# Patient Record
Sex: FEMALE | Race: WHITE | NOT HISPANIC OR LATINO | Employment: UNEMPLOYED | URBAN - NONMETROPOLITAN AREA
[De-identification: names, ages, dates, MRNs, and addresses within clinical notes are randomized per-mention and may not be internally consistent; named-entity substitution may affect disease eponyms.]

---

## 2017-07-29 ENCOUNTER — HISTORY (OUTPATIENT)
Dept: FAMILY MEDICINE | Facility: OTHER | Age: 32
End: 2017-07-29

## 2017-07-29 ENCOUNTER — OFFICE VISIT - GICH (OUTPATIENT)
Dept: FAMILY MEDICINE | Facility: OTHER | Age: 32
End: 2017-07-29

## 2017-07-29 DIAGNOSIS — S91.312A: ICD-10-CM

## 2017-12-28 NOTE — PROGRESS NOTES
"Patient Information     Patient Name MRN Sex     Shey Malone [id number] Female 1985      Progress Notes by Shanelle Segundo NP at 2017 12:30 PM     Author:  Shanelle Segundo NP Service:  (none) Author Type:  PHYS- Nurse Practitioner     Filed:  2017  6:54 PM Encounter Date:  2017 Status:  Signed     :  Shanelle Segundo NP (PHYS- Nurse Practitioner)            HPI:  Nursing Notes:   Rebecca Norton  2017  1:23 PM  Signed  Patient presents to clinic with right foot laceration. She cut it on chain link fence.  Rebecca SHERWOOD NaderN ....................  2017   1:06 PM    Shey Malone is a 31 y.o. female who presents to clinic today for cut on top of right foot. Cut foot on chain link fence. Wrapped with gauze and came to [facility] for evaluation. Last Tdap .     No past medical history on file.  No past surgical history on file.  Social History     Substance Use Topics       Smoking status: Never Smoker     Smokeless tobacco: Never Used     Alcohol use Not on file     No current outpatient prescriptions on file.     No current facility-administered medications for this visit.      Medications have been reviewed by me and are current to the best of my knowledge and ability.    No Known Allergies    ROS:  Refer to HPI    /68  Pulse 76  Temp 98.7  F (37.1  C) (Tympanic)   Ht 1.575 m (5' 2\")  Wt 59.4 kg (131 lb)  LMP 2017  BMI 23.96 kg/m2    EXAM:  General Appearance: Well appearing  appropriate appearance for age. No acute distress  Cardiovascular: Brisk pedal pulse right foot  Musculoskeletal: good ROM right foot, no tendon involvement  Dermatological: superficial laceration, 4 cm jagged linear laceration dorsal right foot, inferior to second to fifth phlanges  Psychological: normal affect, alert and pleasant    ASSESSMENT/PLAN:    ICD-10-CM    1. Foot laceration, left, initial encounter S91.312A CT REPAIR SUPERF " WOUND BODY < OR = 2.5 CM   Laceration across top of right foot  On exam: well appearing female in no apparent distress with good rom in right foot, no evidence of tendon involvement with 4 cm jagged linear laceration dorsal right foot, inferior to second to fifth phlanges  Diagnosis: Laceration  Discussed risks and benefits of closing laceration with sutures vs Dermabond, the nature of this wound is superficial and the patient desires a quick closure  Dermabond seems appropriate at this time  Wound is thoroughly cleaned by nursing staff with Hibiclens  Dermabond is applied and allowed to dry, Telfa and gauze dressing applied  Patient tolerated well  Instructed not to apply any petroleum based products to Dermabound  Please follow up if s/s of infection devlop-or any concerns    Patient Instructions      Index Chinese   Wound Closure and Wound Care   ________________________________________________________________________  KEY POINTS    Most wounds heal more quickly, and with less risk of infection and scarring, when the wound is kept clean and the edges are held close together as it heals.    Your healthcare provider's instructions will include the type of follow up care you will need.    Call your healthcare provider if you have increased bleeding, redness, swelling, pain, fever, or any other questions about caring for your wound.  ________________________________________________________________________  What is wound closure?  Wound closure is a procedure that your healthcare provider can do to hold the edges of a wound together. Most wounds heal more quickly, and with less risk of infection and scarring, when the wound is kept clean and the edges are held close together as it heals.  Cuts and punctures are wounds that go through several layers of skin and may go into the fat, muscle, blood vessels, or other tissues under the skin. A cut through your skin made by a healthcare provider when you have surgery is  another type of wound that may need closure.  Wounds may be closed with skin glue, tape strips, stitches, or staples. Which method will be used depends on the wound. Small, superficial (not deep) wounds can often easily be closed with glue. Deeper wounds may need tape, stitches, or staples.  If you have not had a tetanus shot in the last 5 years, and you have a dirty wound, your healthcare provider may give you a tetanus shot.  It is very important to follow your provider's instructions to help the wound heal, to prevent an infection, and to lessen scarring.  How can I take care of myself?  Follow the instructions from your healthcare provider for taking care of the wound. Instructions may include:    Keep the wound clean and dry. Don't get the wound wet unless your healthcare provider has told you it s OK. Ask if it s OK for you to take a bath or shower. Ask when it will be okay for you to go swimming.    Wash your hands thoroughly with soap and water for at least 20 seconds before you touch the area.    If you have a bandage over the wound, follow your provider's instructions for cleaning the wound and changing the bandage. Don t use any ointment, cream, or lotion on the wound unless your provider has told you to do so.    If your wound is large and on your arm or leg, you may need to keep the wound up higher than your heart as often as possible for a few days. For example, you can put your arm or leg up on a pillow. This will help lessen pain and swelling.    Protect the wound from injury and sunburn until the skin has had time to heal.    Don t scratch, rub, or pick at the wound. This can cause the wound to open up and can cause infection and a larger scar.    Your healthcare provider's instructions will include the type of follow up care you will need. For example:    Tape strips are often left on until they fall off on their own. Some tape strips may start to fall off a few days after they are put on. All of  the tape strips should fall off by the end of 2 weeks.    Skin glue falls off on its own in 5 to 10 days.    Stitches or staples need to be removed by your healthcare provider in 5 to 14 days, depending on where the wound is located. Some stitches dissolve on their own in 7 to 14 days and don t need to be removed. Ask your healthcare provider what kind of stitches you have.    As the wound heals, some swelling, redness, and minor pain are normal.    Keep your appointment for stitch or staple removal if you need one.    Call your healthcare provider if you have:    Symptoms of infection, which include new or worse redness, swelling, pain, warmth, or drainage in the area of the wound    New bleeding from the wound that won't stop    Pain that is increasing or not getting better with pain medicine    Fever, chills, nausea, vomiting, or muscle aches    Any questions about caring for the wound  Developed by [employer].  Adult Advisor 2016.3 published by [employer].  Last modified: 2015-10-02  Last reviewed: 2015-10-02  This content is reviewed periodically and is subject to change as new health information becomes available. The information is intended to inform and educate and is not a replacement for medical evaluation, advice, diagnosis or treatment by a healthcare professional.  References   Adult Advisor 2016.3 Index    Copyright   2016 [employer], a division of [employer] All rights reserved.

## 2017-12-28 NOTE — PATIENT INSTRUCTIONS
Patient Information     Patient Name MRN Shey Cao [id number] Female 1985      Patient Instructions by Shanelle Segundo NP at 2017 12:30 PM     Author:  Shanelle Segundo NP Service:  (none) Author Type:  PHYS- Nurse Practitioner     Filed:  2017  1:47 PM Encounter Date:  2017 Status:  Signed     :  Shanelle Segundo NP (PHYS- Nurse Practitioner)               Index Setswana   Wound Closure and Wound Care   ________________________________________________________________________  KEY POINTS    Most wounds heal more quickly, and with less risk of infection and scarring, when the wound is kept clean and the edges are held close together as it heals.    Your healthcare provider's instructions will include the type of follow up care you will need.    Call your healthcare provider if you have increased bleeding, redness, swelling, pain, fever, or any other questions about caring for your wound.  ________________________________________________________________________  What is wound closure?  Wound closure is a procedure that your healthcare provider can do to hold the edges of a wound together. Most wounds heal more quickly, and with less risk of infection and scarring, when the wound is kept clean and the edges are held close together as it heals.  Cuts and punctures are wounds that go through several layers of skin and may go into the fat, muscle, blood vessels, or other tissues under the skin. A cut through your skin made by a healthcare provider when you have surgery is another type of wound that may need closure.  Wounds may be closed with skin glue, tape strips, stitches, or staples. Which method will be used depends on the wound. Small, superficial (not deep) wounds can often easily be closed with glue. Deeper wounds may need tape, stitches, or staples.  If you have not had a tetanus shot in the last 5 years, and you have a dirty wound, your  healthcare provider may give you a tetanus shot.  It is very important to follow your provider's instructions to help the wound heal, to prevent an infection, and to lessen scarring.  How can I take care of myself?  Follow the instructions from your healthcare provider for taking care of the wound. Instructions may include:    Keep the wound clean and dry. Don't get the wound wet unless your healthcare provider has told you it s OK. Ask if it s OK for you to take a bath or shower. Ask when it will be okay for you to go swimming.    Wash your hands thoroughly with soap and water for at least 20 seconds before you touch the area.    If you have a bandage over the wound, follow your provider's instructions for cleaning the wound and changing the bandage. Don t use any ointment, cream, or lotion on the wound unless your provider has told you to do so.    If your wound is large and on your arm or leg, you may need to keep the wound up higher than your heart as often as possible for a few days. For example, you can put your arm or leg up on a pillow. This will help lessen pain and swelling.    Protect the wound from injury and sunburn until the skin has had time to heal.    Don t scratch, rub, or pick at the wound. This can cause the wound to open up and can cause infection and a larger scar.    Your healthcare provider's instructions will include the type of follow up care you will need. For example:    Tape strips are often left on until they fall off on their own. Some tape strips may start to fall off a few days after they are put on. All of the tape strips should fall off by the end of 2 weeks.    Skin glue falls off on its own in 5 to 10 days.    Stitches or staples need to be removed by your healthcare provider in 5 to 14 days, depending on where the wound is located. Some stitches dissolve on their own in 7 to 14 days and don t need to be removed. Ask your healthcare provider what kind of stitches you have.    As  the wound heals, some swelling, redness, and minor pain are normal.    Keep your appointment for stitch or staple removal if you need one.    Call your healthcare provider if you have:    Symptoms of infection, which include new or worse redness, swelling, pain, warmth, or drainage in the area of the wound    New bleeding from the wound that won't stop    Pain that is increasing or not getting better with pain medicine    Fever, chills, nausea, vomiting, or muscle aches    Any questions about caring for the wound  Developed by [employer].  Adult Advisor 2016.3 published by [employer].  Last modified: 2015-10-02  Last reviewed: 2015-10-02  This content is reviewed periodically and is subject to change as new health information becomes available. The information is intended to inform and educate and is not a replacement for medical evaluation, advice, diagnosis or treatment by a healthcare professional.  References   Adult Advisor 2016.3 Index    Copyright   2016 [employer], a division of [employer] All rights reserved.

## 2017-12-30 NOTE — NURSING NOTE
Patient Information     Patient Name MRN Shey Cao [id number] Female 1985      Nursing Note by Rebecca Norton at 2017 12:30 PM     Author:  Rebecca Norton Service:  (none) Author Type:  (none)     Filed:  2017  1:23 PM Encounter Date:  2017 Status:  Signed     :  Rebecca Norton            Patient presents to clinic with right foot laceration. She cut it on chain link fence.  Rebecca Foster ....................  2017   1:06 PM

## 2018-01-27 VITALS
SYSTOLIC BLOOD PRESSURE: 118 MMHG | BODY MASS INDEX: 24.11 KG/M2 | DIASTOLIC BLOOD PRESSURE: 68 MMHG | HEIGHT: 62 IN | TEMPERATURE: 98.7 F | HEART RATE: 76 BPM | WEIGHT: 131 LBS

## 2020-09-09 ENCOUNTER — TRANSFERRED RECORDS (OUTPATIENT)
Dept: MULTI SPECIALTY CLINIC | Facility: CLINIC | Age: 35
End: 2020-09-09

## 2020-09-09 LAB
HPV ABSTRACT: NORMAL
PAP-ABSTRACT: ABNORMAL

## 2024-04-02 ENCOUNTER — HOSPITAL ENCOUNTER (EMERGENCY)
Facility: OTHER | Age: 39
Discharge: HOME | End: 2024-04-02
Attending: EMERGENCY MEDICINE | Admitting: EMERGENCY MEDICINE
Payer: COMMERCIAL

## 2024-04-02 VITALS
TEMPERATURE: 97.7 F | RESPIRATION RATE: 13 BRPM | HEART RATE: 85 BPM | DIASTOLIC BLOOD PRESSURE: 76 MMHG | SYSTOLIC BLOOD PRESSURE: 132 MMHG | WEIGHT: 140 LBS | BODY MASS INDEX: 25.76 KG/M2 | OXYGEN SATURATION: 100 % | HEIGHT: 62 IN

## 2024-04-02 DIAGNOSIS — R06.02 SHORTNESS OF BREATH: ICD-10-CM

## 2024-04-02 DIAGNOSIS — R07.89 CHEST HEAVINESS: ICD-10-CM

## 2024-04-02 LAB
ALBUMIN UR-MCNC: NEGATIVE MG/DL
ANION GAP SERPL CALCULATED.3IONS-SCNC: 16 MMOL/L (ref 7–15)
APPEARANCE UR: CLEAR
BACTERIA #/AREA URNS HPF: ABNORMAL /HPF
BASOPHILS # BLD AUTO: 0 10E3/UL (ref 0–0.2)
BASOPHILS NFR BLD AUTO: 1 %
BILIRUB UR QL STRIP: NEGATIVE
BUN SERPL-MCNC: 13.1 MG/DL (ref 6–20)
CALCIUM SERPL-MCNC: 9.7 MG/DL (ref 8.6–10)
CHLORIDE SERPL-SCNC: 101 MMOL/L (ref 98–107)
COLOR UR AUTO: YELLOW
CREAT SERPL-MCNC: 0.71 MG/DL (ref 0.51–0.95)
DEPRECATED HCO3 PLAS-SCNC: 21 MMOL/L (ref 22–29)
EGFRCR SERPLBLD CKD-EPI 2021: >90 ML/MIN/1.73M2
EOSINOPHIL # BLD AUTO: 0.1 10E3/UL (ref 0–0.7)
EOSINOPHIL NFR BLD AUTO: 1 %
ERYTHROCYTE [DISTWIDTH] IN BLOOD BY AUTOMATED COUNT: 12.5 % (ref 10–15)
GLUCOSE SERPL-MCNC: 153 MG/DL (ref 70–99)
GLUCOSE UR STRIP-MCNC: NEGATIVE MG/DL
HCG UR QL: NEGATIVE
HCT VFR BLD AUTO: 38.2 % (ref 35–47)
HGB BLD-MCNC: 12.9 G/DL (ref 11.7–15.7)
HGB UR QL STRIP: NEGATIVE
HOLD SPECIMEN: NORMAL
IMM GRANULOCYTES # BLD: 0 10E3/UL
IMM GRANULOCYTES NFR BLD: 0 %
KETONES UR STRIP-MCNC: NEGATIVE MG/DL
LEUKOCYTE ESTERASE UR QL STRIP: NEGATIVE
LYMPHOCYTES # BLD AUTO: 1.6 10E3/UL (ref 0.8–5.3)
LYMPHOCYTES NFR BLD AUTO: 20 %
MAGNESIUM SERPL-MCNC: 2 MG/DL (ref 1.7–2.3)
MCH RBC QN AUTO: 29.6 PG (ref 26.5–33)
MCHC RBC AUTO-ENTMCNC: 33.8 G/DL (ref 31.5–36.5)
MCV RBC AUTO: 88 FL (ref 78–100)
MONOCYTES # BLD AUTO: 0.4 10E3/UL (ref 0–1.3)
MONOCYTES NFR BLD AUTO: 5 %
NEUTROPHILS # BLD AUTO: 5.7 10E3/UL (ref 1.6–8.3)
NEUTROPHILS NFR BLD AUTO: 73 %
NITRATE UR QL: NEGATIVE
NRBC # BLD AUTO: 0 10E3/UL
NRBC BLD AUTO-RTO: 0 /100
PH UR STRIP: 6 [PH] (ref 5–9)
PLATELET # BLD AUTO: 299 10E3/UL (ref 150–450)
POTASSIUM SERPL-SCNC: 3.1 MMOL/L (ref 3.4–5.3)
RBC # BLD AUTO: 4.36 10E6/UL (ref 3.8–5.2)
RBC URINE: 1 /HPF
SODIUM SERPL-SCNC: 138 MMOL/L (ref 135–145)
SP GR UR STRIP: 1 (ref 1–1.03)
UROBILINOGEN UR STRIP-MCNC: NORMAL MG/DL
WBC # BLD AUTO: 7.7 10E3/UL (ref 4–11)
WBC URINE: 1 /HPF

## 2024-04-02 PROCEDURE — 81025 URINE PREGNANCY TEST: CPT | Performed by: EMERGENCY MEDICINE

## 2024-04-02 PROCEDURE — 93005 ELECTROCARDIOGRAM TRACING: CPT | Performed by: EMERGENCY MEDICINE

## 2024-04-02 PROCEDURE — 99284 EMERGENCY DEPT VISIT MOD MDM: CPT | Performed by: EMERGENCY MEDICINE

## 2024-04-02 PROCEDURE — 93010 ELECTROCARDIOGRAM REPORT: CPT | Performed by: INTERNAL MEDICINE

## 2024-04-02 PROCEDURE — 36415 COLL VENOUS BLD VENIPUNCTURE: CPT | Performed by: EMERGENCY MEDICINE

## 2024-04-02 PROCEDURE — 81001 URINALYSIS AUTO W/SCOPE: CPT | Performed by: EMERGENCY MEDICINE

## 2024-04-02 PROCEDURE — 80048 BASIC METABOLIC PNL TOTAL CA: CPT | Performed by: EMERGENCY MEDICINE

## 2024-04-02 PROCEDURE — 83735 ASSAY OF MAGNESIUM: CPT | Performed by: EMERGENCY MEDICINE

## 2024-04-02 PROCEDURE — 85025 COMPLETE CBC W/AUTO DIFF WBC: CPT | Performed by: EMERGENCY MEDICINE

## 2024-04-02 RX ORDER — HYDROXYZINE PAMOATE 25 MG/1
25 CAPSULE ORAL 3 TIMES DAILY PRN
Qty: 5 CAPSULE | Refills: 0 | Status: SHIPPED | OUTPATIENT
Start: 2024-04-02

## 2024-04-02 ASSESSMENT — ENCOUNTER SYMPTOMS
CHILLS: 0
VOMITING: 0
AGITATION: 0
FEVER: 0
NAUSEA: 0
ABDOMINAL PAIN: 0
LIGHT-HEADEDNESS: 1
PALPITATIONS: 1
ARTHRALGIAS: 0
DYSURIA: 0
SHORTNESS OF BREATH: 1
CHEST TIGHTNESS: 0

## 2024-04-02 ASSESSMENT — COLUMBIA-SUICIDE SEVERITY RATING SCALE - C-SSRS
2. HAVE YOU ACTUALLY HAD ANY THOUGHTS OF KILLING YOURSELF IN THE PAST MONTH?: NO
1. IN THE PAST MONTH, HAVE YOU WISHED YOU WERE DEAD OR WISHED YOU COULD GO TO SLEEP AND NOT WAKE UP?: NO
6. HAVE YOU EVER DONE ANYTHING, STARTED TO DO ANYTHING, OR PREPARED TO DO ANYTHING TO END YOUR LIFE?: NO

## 2024-04-02 ASSESSMENT — ACTIVITIES OF DAILY LIVING (ADL)
ADLS_ACUITY_SCORE: 35
ADLS_ACUITY_SCORE: 35
ADLS_ACUITY_SCORE: 33

## 2024-04-02 NOTE — DISCHARGE INSTRUCTIONS
If this happens again you could try one of the Vistaril tablets.  Follow-up in the clinic to discuss with primary provider.  You can also try calling 211 if this were to happen again.  You can always return here for further evaluation as well.

## 2024-04-02 NOTE — ED PROVIDER NOTES
History     Chief Complaint   Patient presents with    Shortness of Breath    Chest Pain     HPI  Shey Malone is a 38 year old female who comes in by private car after having had an episode of shortness of breath, chest heaviness and feeling a little bit spacey.  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  She was able to finish her piano lesson.  Symptoms then seemed to get somewhat better but seem to be continue to come and go since then.  She says she had a similar episode last week which did not last nearly as long.  Today she said she could also feel her heart beating in her chest.  She said it was regular and fast.  There is no pain.  No diaphoresis no nausea.  Nothing made it better or worse.  Has not been ill recently since she recovered from some mild URI type symptoms a couple weeks ago.  Has been eating and drinking normally.  No pain anywhere.  She herself brought up the possibility that this could be panic attacks, however she says she has not been feeling anxious or panicky and has never had symptoms like this in the past.  Her  states that when he got home from work today to bring her in and she seemed okay.  In the ride in however she seemed to get worse again.  She got better by the time they are here and then walking him again got worse.  When he describes that she gets a little bit tearful.    Allergies:  No Known Allergies    Problem List:    There are no problems to display for this patient.       Past Medical History:    No past medical history on file.    Past Surgical History:    No past surgical history on file.    Family History:    No family history on file.    Social History:  Marital Status:   [2]  Social History     Tobacco Use    Smoking status: Never    Smokeless tobacco: Never   Substance Use Topics    Drug use: Unknown     Types: Other     Comment: Drug use: Not Asked        Medications:    hydrOXYzine  "sonia (VISTARIL) 25 MG capsule          Review of Systems   Constitutional:  Negative for chills and fever.   HENT:  Negative for congestion.    Eyes:  Negative for visual disturbance.   Respiratory:  Positive for shortness of breath. Negative for chest tightness.    Cardiovascular:  Positive for palpitations. Negative for chest pain.   Gastrointestinal:  Negative for abdominal pain, nausea and vomiting.   Genitourinary:  Negative for dysuria.   Musculoskeletal:  Negative for arthralgias.   Skin:  Negative for rash.   Neurological:  Positive for light-headedness.   Psychiatric/Behavioral:  Negative for agitation.        Physical Exam   BP: (!) 154/93  Pulse: (!) 127  Temp: 97.8  F (36.6  C)  Resp: 24  Height: 157.5 cm (5' 2\")  Weight: 63.5 kg (140 lb)  SpO2: 100 %      Physical Exam  Vitals and nursing note reviewed.   Constitutional:       Appearance: She is well-developed.   HENT:      Head: Normocephalic and atraumatic.      Mouth/Throat:      Mouth: Mucous membranes are moist.   Eyes:      Conjunctiva/sclera: Conjunctivae normal.   Cardiovascular:      Rate and Rhythm: Normal rate and regular rhythm.      Heart sounds: Normal heart sounds.   Pulmonary:      Effort: Pulmonary effort is normal.      Breath sounds: Normal breath sounds.   Abdominal:      General: Abdomen is flat. Bowel sounds are normal. There is no distension.      Palpations: Abdomen is soft.      Tenderness: There is no abdominal tenderness.   Skin:     General: Skin is warm and dry.   Neurological:      Mental Status: She is alert and oriented to person, place, and time.   Psychiatric:         Speech: Speech normal.         Behavior: Behavior normal.      Comments: She does seem a little nervous and anxious about this.  Does get slightly tearful at 1 point during the exam also.         ED Course        Procedures         EKG shows sinus tachycardia 110 bpm.  No acute ST segment or T wave changes.  No ectopy.         Results for orders placed or " performed during the hospital encounter of 04/02/24 (from the past 24 hour(s))   [place] Draw    Narrative    The following orders were created for panel order [place] Draw.  Procedure                               Abnormality         Status                     ---------                               -----------         ------                     Extra Blue Top Tube[[id number]]                              Final result               Extra Red Top Tube[[id number]]                               Final result               Extra Green Top (Lithium...[[id number]]                      Final result               Extra Purple Top Tube[[id number]]                            Final result                 Please view results for these tests on the individual orders.   Extra Blue Top Tube   Result Value Ref Range    Hold Specimen JIC    Extra Red Top Tube   Result Value Ref Range    Hold Specimen hold    Extra Green Top (Lithium Heparin) Tube   Result Value Ref Range    Hold Specimen JIC    Extra Purple Top Tube   Result Value Ref Range    Hold Specimen JIC    CBC with platelets differential    Narrative    The following orders were created for panel order CBC with platelets differential.  Procedure                               Abnormality         Status                     ---------                               -----------         ------                     CBC with platelets and d...[[id number]]                      Final result                 Please view results for these tests on the individual orders.   CBC with platelets and differential   Result Value Ref Range    WBC Count 7.7 4.0 - 11.0 10e3/uL    RBC Count 4.36 3.80 - 5.20 10e6/uL    Hemoglobin 12.9 11.7 - 15.7 g/dL    Hematocrit 38.2 35.0 - 47.0 %    MCV 88 78 - 100 fL    MCH 29.6 26.5 - 33.0 pg    MCHC 33.8 31.5 - 36.5 g/dL    RDW 12.5 10.0 - 15.0 %    Platelet Count 299 150 - 450 10e3/uL    % Neutrophils 73 %    % Lymphocytes 20 %    % Monocytes 5 %    % Eosinophils 1  %    % Basophils 1 %    % Immature Granulocytes 0 %    NRBCs per 100 WBC 0 <1 /100    Absolute Neutrophils 5.7 1.6 - 8.3 10e3/uL    Absolute Lymphocytes 1.6 0.8 - 5.3 10e3/uL    Absolute Monocytes 0.4 0.0 - 1.3 10e3/uL    Absolute Eosinophils 0.1 0.0 - 0.7 10e3/uL    Absolute Basophils 0.0 0.0 - 0.2 10e3/uL    Absolute Immature Granulocytes 0.0 <=0.4 10e3/uL    Absolute NRBCs 0.0 10e3/uL   Extra Tube *Canceled*    Narrative    The following orders were created for panel order Extra Tube.  Procedure                               Abnormality         Status                     ---------                               -----------         ------                     Extra Purple Top Tube[[id number]]                                                         Please view results for these tests on the individual orders.   Extra Tube    Narrative    The following orders were created for panel order Extra Tube.  Procedure                               Abnormality         Status                     ---------                               -----------         ------                     Extra Green Top (Lithium...[[id number]]                      Final result                 Please view results for these tests on the individual orders.   Extra Green Top (Lithium Heparin) ON ICE   Result Value Ref Range    Hold Specimen [facility]    Basic metabolic panel   Result Value Ref Range    Sodium 138 135 - 145 mmol/L    Potassium 3.1 (L) 3.4 - 5.3 mmol/L    Chloride 101 98 - 107 mmol/L    Carbon Dioxide (CO2) 21 (L) 22 - 29 mmol/L    Anion Gap 16 (H) 7 - 15 mmol/L    Urea Nitrogen 13.1 6.0 - 20.0 mg/dL    Creatinine 0.71 0.51 - 0.95 mg/dL    GFR Estimate >90 >60 mL/min/1.73m2    Calcium 9.7 8.6 - 10.0 mg/dL    Glucose 153 (H) 70 - 99 mg/dL   Magnesium   Result Value Ref Range    Magnesium 2.0 1.7 - 2.3 mg/dL   UA with Microscopic reflex to Culture    Specimen: Urine, Clean Catch   Result Value Ref Range    Color Urine Yellow Colorless, Straw, Light  Yellow, Yellow    Appearance Urine Clear Clear    Glucose Urine Negative Negative mg/dL    Bilirubin Urine Negative Negative    Ketones Urine Negative Negative mg/dL    Specific Gravity Urine 1.004 1.000 - 1.030    Blood Urine Negative Negative    pH Urine 6.0 5.0 - 9.0    Protein Albumin Urine Negative Negative mg/dL    Urobilinogen Urine Normal Normal, 2.0 mg/dL    Nitrite Urine Negative Negative    Leukocyte Esterase Urine Negative Negative    Bacteria Urine Few (A) None Seen /HPF    RBC Urine 1 <=2 /HPF    WBC Urine 1 <=5 /HPF    Narrative    Urine Culture not indicated   HCG qualitative urine (UPT)   Result Value Ref Range    hCG Urine Qualitative Negative Negative       Medications - No data to display    Assessments & Plan (with Medical Decision Making)     I have reviewed the nursing notes.    I have reviewed the findings, diagnosis, plan and need for follow up with the patient.  Labs show a mild hypokalemia, otherwise CBC, metabolic panel, urinalysis and hCG all unremarkable.  She has been on the cardiac monitor her entire stay here and has been in sinus rhythm with no ectopy or any other arrhythmias.  We discussed the possibility of something like a cardiac arrhythmia that could be causing her symptoms, however I do not see any evidence of that at this time and I feel is somewhat unlikely.  She is nervous and somewhat tearful during her stay here, I do believe that she is very anxious regarding this.  I believe anxiety could be what is causing it as well.  She does not currently have a primary provider but is willing to get 1.  Appointment will be made for her in clinic with family practice provider.  We discussed outpatient cardiac monitor, however she wanted to hold off on this for now.  She was reluctant but did agree to a small prescription of some Vistaril that she could take should her symptoms return.  I also suggested calling 211 if her symptoms happen again.  Can always return here if  worse.        New Prescriptions    HYDROXYZINE MICHAEL (VISTARIL) 25 MG CAPSULE    Take 1 capsule (25 mg) by mouth 3 times daily as needed for anxiety       Final diagnoses:   Shortness of breath   Chest heaviness       4/2/2024   [facility]  04/02/24 0748

## 2024-04-02 NOTE — ED TRIAGE NOTES
"Pt arrives with shortness of breath and chest heaviness since about 1130 today. Pt has no cardiac history. Pt is not on blood thinners.   BP (!) 159/96   Pulse (!) 136   Temp 97.7  F (36.5  C)   Resp 24   Ht 1.575 m (5' 2\")   Wt 63.5 kg (140 lb)   SpO2 100%   BMI 25.61 kg/m          Triage Assessment (Adult)       Row Name 04/02/24 1407          Triage Assessment    Airway WDL WDL        Respiratory WDL    Respiratory WDL X        Skin Circulation/Temperature WDL    Skin Circulation/Temperature WDL WDL        Cardiac WDL    Cardiac WDL X        Peripheral/Neurovascular WDL    Peripheral Neurovascular WDL WDL        Cognitive/Neuro/Behavioral WDL    Cognitive/Neuro/Behavioral WDL WDL                     "

## 2024-04-04 ENCOUNTER — HOSPITAL ENCOUNTER (EMERGENCY)
Facility: OTHER | Age: 39
Discharge: HOME | End: 2024-04-04
Attending: PHYSICIAN ASSISTANT
Payer: COMMERCIAL

## 2024-04-04 ENCOUNTER — APPOINTMENT (OUTPATIENT)
Dept: GENERAL RADIOLOGY | Facility: OTHER | Age: 39
End: 2024-04-04
Attending: PHYSICIAN ASSISTANT
Payer: COMMERCIAL

## 2024-04-04 ENCOUNTER — ALLIED HEALTH/NURSE VISIT (OUTPATIENT)
Dept: FAMILY MEDICINE | Facility: OTHER | Age: 39
End: 2024-04-04
Attending: PHYSICIAN ASSISTANT
Payer: COMMERCIAL

## 2024-04-04 VITALS
SYSTOLIC BLOOD PRESSURE: 123 MMHG | HEIGHT: 62 IN | RESPIRATION RATE: 18 BRPM | BODY MASS INDEX: 25.76 KG/M2 | OXYGEN SATURATION: 100 % | DIASTOLIC BLOOD PRESSURE: 73 MMHG | WEIGHT: 140 LBS | TEMPERATURE: 98.4 F | HEART RATE: 78 BPM

## 2024-04-04 DIAGNOSIS — R00.2 PALPITATIONS: ICD-10-CM

## 2024-04-04 LAB
ALBUMIN SERPL BCG-MCNC: 4.7 G/DL (ref 3.5–5.2)
ALBUMIN UR-MCNC: 10 MG/DL
ALP SERPL-CCNC: 60 U/L (ref 40–150)
ALT SERPL W P-5'-P-CCNC: 14 U/L (ref 0–50)
ANION GAP SERPL CALCULATED.3IONS-SCNC: 12 MMOL/L (ref 7–15)
APPEARANCE UR: CLEAR
AST SERPL W P-5'-P-CCNC: 15 U/L (ref 0–45)
ATRIAL RATE - MUSE: 110 BPM
BACTERIA #/AREA URNS HPF: ABNORMAL /HPF
BASOPHILS # BLD AUTO: 0 10E3/UL (ref 0–0.2)
BASOPHILS NFR BLD AUTO: 1 %
BILIRUB DIRECT SERPL-MCNC: <0.2 MG/DL (ref 0–0.3)
BILIRUB SERPL-MCNC: 0.3 MG/DL
BILIRUB UR QL STRIP: NEGATIVE
BUN SERPL-MCNC: 13.7 MG/DL (ref 6–20)
CALCIUM SERPL-MCNC: 9.7 MG/DL (ref 8.6–10)
CHLORIDE SERPL-SCNC: 102 MMOL/L (ref 98–107)
COLOR UR AUTO: YELLOW
CREAT SERPL-MCNC: 0.77 MG/DL (ref 0.51–0.95)
D DIMER PPP FEU-MCNC: <=0.27 UG/ML FEU (ref 0–0.5)
DEPRECATED HCO3 PLAS-SCNC: 26 MMOL/L (ref 22–29)
DIASTOLIC BLOOD PRESSURE - MUSE: NORMAL MMHG
EGFRCR SERPLBLD CKD-EPI 2021: >90 ML/MIN/1.73M2
EOSINOPHIL # BLD AUTO: 0.1 10E3/UL (ref 0–0.7)
EOSINOPHIL NFR BLD AUTO: 1 %
ERYTHROCYTE [DISTWIDTH] IN BLOOD BY AUTOMATED COUNT: 12.7 % (ref 10–15)
GLUCOSE SERPL-MCNC: 99 MG/DL (ref 70–99)
GLUCOSE UR STRIP-MCNC: NEGATIVE MG/DL
HCG UR QL: NEGATIVE
HCT VFR BLD AUTO: 39 % (ref 35–47)
HGB BLD-MCNC: 12.8 G/DL (ref 11.7–15.7)
HGB UR QL STRIP: NEGATIVE
HOLD SPECIMEN: NORMAL
IMM GRANULOCYTES # BLD: 0 10E3/UL
IMM GRANULOCYTES NFR BLD: 0 %
INTERPRETATION ECG - MUSE: NORMAL
KETONES UR STRIP-MCNC: 60 MG/DL
LEUKOCYTE ESTERASE UR QL STRIP: ABNORMAL
LYMPHOCYTES # BLD AUTO: 2.1 10E3/UL (ref 0.8–5.3)
LYMPHOCYTES NFR BLD AUTO: 38 %
MAGNESIUM SERPL-MCNC: 2 MG/DL (ref 1.7–2.3)
MCH RBC QN AUTO: 29.3 PG (ref 26.5–33)
MCHC RBC AUTO-ENTMCNC: 32.8 G/DL (ref 31.5–36.5)
MCV RBC AUTO: 89 FL (ref 78–100)
MONOCYTES # BLD AUTO: 0.4 10E3/UL (ref 0–1.3)
MONOCYTES NFR BLD AUTO: 7 %
MUCOUS THREADS #/AREA URNS LPF: PRESENT /LPF
NEUTROPHILS # BLD AUTO: 2.9 10E3/UL (ref 1.6–8.3)
NEUTROPHILS NFR BLD AUTO: 53 %
NITRATE UR QL: NEGATIVE
NRBC # BLD AUTO: 0 10E3/UL
NRBC BLD AUTO-RTO: 0 /100
NT-PROBNP SERPL-MCNC: <36 PG/ML (ref 0–450)
P AXIS - MUSE: 68 DEGREES
PH UR STRIP: 5.5 [PH] (ref 5–9)
PLATELET # BLD AUTO: 260 10E3/UL (ref 150–450)
POTASSIUM SERPL-SCNC: 3.7 MMOL/L (ref 3.4–5.3)
PR INTERVAL - MUSE: 166 MS
PROT SERPL-MCNC: 7.7 G/DL (ref 6.4–8.3)
QRS DURATION - MUSE: 86 MS
QT - MUSE: 344 MS
QTC - MUSE: 465 MS
R AXIS - MUSE: 90 DEGREES
RBC # BLD AUTO: 4.37 10E6/UL (ref 3.8–5.2)
RBC URINE: 3 /HPF
SODIUM SERPL-SCNC: 140 MMOL/L (ref 135–145)
SP GR UR STRIP: 1.03 (ref 1–1.03)
SQUAMOUS EPITHELIAL: 4 /HPF
SYSTOLIC BLOOD PRESSURE - MUSE: NORMAL MMHG
T AXIS - MUSE: 18 DEGREES
TROPONIN T SERPL HS-MCNC: <6 NG/L
UROBILINOGEN UR STRIP-MCNC: NORMAL MG/DL
VENTRICULAR RATE- MUSE: 110 BPM
WBC # BLD AUTO: 5.4 10E3/UL (ref 4–11)
WBC URINE: 7 /HPF

## 2024-04-04 PROCEDURE — 81001 URINALYSIS AUTO W/SCOPE: CPT | Performed by: PHYSICIAN ASSISTANT

## 2024-04-04 PROCEDURE — 81025 URINE PREGNANCY TEST: CPT | Performed by: PHYSICIAN ASSISTANT

## 2024-04-04 PROCEDURE — 87086 URINE CULTURE/COLONY COUNT: CPT | Performed by: PHYSICIAN ASSISTANT

## 2024-04-04 PROCEDURE — 83735 ASSAY OF MAGNESIUM: CPT | Performed by: PHYSICIAN ASSISTANT

## 2024-04-04 PROCEDURE — 82728 ASSAY OF FERRITIN: CPT

## 2024-04-04 PROCEDURE — 93242 EXT ECG>48HR<7D RECORDING: CPT

## 2024-04-04 PROCEDURE — 93005 ELECTROCARDIOGRAM TRACING: CPT | Mod: XU | Performed by: PHYSICIAN ASSISTANT

## 2024-04-04 PROCEDURE — 83550 IRON BINDING TEST: CPT

## 2024-04-04 PROCEDURE — 84484 ASSAY OF TROPONIN QUANT: CPT | Performed by: PHYSICIAN ASSISTANT

## 2024-04-04 PROCEDURE — 71045 X-RAY EXAM CHEST 1 VIEW: CPT | Mod: TC

## 2024-04-04 PROCEDURE — 93010 ELECTROCARDIOGRAM REPORT: CPT | Performed by: INTERNAL MEDICINE

## 2024-04-04 PROCEDURE — 36415 COLL VENOUS BLD VENIPUNCTURE: CPT | Performed by: PHYSICIAN ASSISTANT

## 2024-04-04 PROCEDURE — 80053 COMPREHEN METABOLIC PANEL: CPT | Performed by: PHYSICIAN ASSISTANT

## 2024-04-04 PROCEDURE — 99284 EMERGENCY DEPT VISIT MOD MDM: CPT | Performed by: PHYSICIAN ASSISTANT

## 2024-04-04 PROCEDURE — 85025 COMPLETE CBC W/AUTO DIFF WBC: CPT | Performed by: PHYSICIAN ASSISTANT

## 2024-04-04 PROCEDURE — 99285 EMERGENCY DEPT VISIT HI MDM: CPT | Mod: 25 | Performed by: PHYSICIAN ASSISTANT

## 2024-04-04 PROCEDURE — 85379 FIBRIN DEGRADATION QUANT: CPT | Performed by: PHYSICIAN ASSISTANT

## 2024-04-04 PROCEDURE — 83880 ASSAY OF NATRIURETIC PEPTIDE: CPT | Performed by: PHYSICIAN ASSISTANT

## 2024-04-04 ASSESSMENT — ACTIVITIES OF DAILY LIVING (ADL)
ADLS_ACUITY_SCORE: 35
ADLS_ACUITY_SCORE: 33
ADLS_ACUITY_SCORE: 35

## 2024-04-04 ASSESSMENT — COLUMBIA-SUICIDE SEVERITY RATING SCALE - C-SSRS
1. IN THE PAST MONTH, HAVE YOU WISHED YOU WERE DEAD OR WISHED YOU COULD GO TO SLEEP AND NOT WAKE UP?: NO
2. HAVE YOU ACTUALLY HAD ANY THOUGHTS OF KILLING YOURSELF IN THE PAST MONTH?: NO
6. HAVE YOU EVER DONE ANYTHING, STARTED TO DO ANYTHING, OR PREPARED TO DO ANYTHING TO END YOUR LIFE?: NO

## 2024-04-05 ASSESSMENT — ENCOUNTER SYMPTOMS
SHORTNESS OF BREATH: 0
CHILLS: 0
COUGH: 0
HEMATURIA: 0
FEVER: 0
PALPITATIONS: 1
ABDOMINAL PAIN: 0

## 2024-04-05 NOTE — PROGRESS NOTES
Shey Malone arrived here on 4/4/2024 10:55 PM for 7 days  Zio monitor placement per ordering provider Doyle Macario for the diagnosis R200 (palpitations) Patient s skin was prepped per protocol.  Zio monitor was placed.  Instructions were reviewed with and given to the patient.  Patient verbalized understanding of wear, troubleshooting and monitor return instructions.   Brenda Wallace, RT

## 2024-04-05 NOTE — ED TRIAGE NOTES
Pt has been having palpations for about a week.  Came in once, negative workup.  Today has been having them intermittently throughout today.  Feeling achy in back and shoulders. Pt states feels like her heart is hiccupping/fluttering.  She has also felt lightheaded with episodes, and after breathing very rapidly.  No other pain associated with it.         Triage Assessment (Adult)       Row Name 04/04/24 1958          Triage Assessment    Airway WDL WDL        Respiratory WDL    Respiratory WDL WDL        Skin Circulation/Temperature WDL    Skin Circulation/Temperature WDL WDL        Cardiac WDL    Cardiac WDL rhythm     Pulse Rate & Regularity radial pulse regular     Cardiac Rhythm NSR        Peripheral/Neurovascular WDL    Peripheral Neurovascular WDL WDL        Cognitive/Neuro/Behavioral WDL    Cognitive/Neuro/Behavioral WDL WDL

## 2024-04-05 NOTE — DISCHARGE INSTRUCTIONS
Get plenty of fluids and rest.  As discussed, all of your lab work, vital signs, physical exam and imaging appear well.  It is difficult to say what is causing your symptoms.  We will send you home with a heart monitor and referral to follow-up with PCP/cardiology.  Return if there are worsening or concerning symptoms.

## 2024-04-06 LAB — BACTERIA UR CULT: NORMAL

## 2024-04-08 ENCOUNTER — OFFICE VISIT (OUTPATIENT)
Dept: INTERNAL MEDICINE | Facility: OTHER | Age: 39
End: 2024-04-08
Payer: COMMERCIAL

## 2024-04-08 VITALS
HEART RATE: 107 BPM | TEMPERATURE: 97.1 F | OXYGEN SATURATION: 99 % | RESPIRATION RATE: 16 BRPM | SYSTOLIC BLOOD PRESSURE: 116 MMHG | HEIGHT: 62 IN | WEIGHT: 140.4 LBS | BODY MASS INDEX: 25.83 KG/M2 | DIASTOLIC BLOOD PRESSURE: 68 MMHG

## 2024-04-08 DIAGNOSIS — E61.1 IRON DEFICIENCY: ICD-10-CM

## 2024-04-08 DIAGNOSIS — R00.2 PALPITATIONS: Primary | ICD-10-CM

## 2024-04-08 LAB
ANION GAP SERPL CALCULATED.3IONS-SCNC: 8 MMOL/L (ref 7–15)
BUN SERPL-MCNC: 9.7 MG/DL (ref 6–20)
CALCIUM SERPL-MCNC: 9.5 MG/DL (ref 8.6–10)
CHLORIDE SERPL-SCNC: 104 MMOL/L (ref 98–107)
CREAT SERPL-MCNC: 0.7 MG/DL (ref 0.51–0.95)
DEPRECATED HCO3 PLAS-SCNC: 27 MMOL/L (ref 22–29)
EGFRCR SERPLBLD CKD-EPI 2021: >90 ML/MIN/1.73M2
FERRITIN SERPL-MCNC: 22 NG/ML (ref 6–175)
GLUCOSE SERPL-MCNC: 106 MG/DL (ref 70–99)
IRON BINDING CAPACITY (ROCHE): 329 UG/DL (ref 240–430)
IRON SATN MFR SERPL: 11 % (ref 15–46)
IRON SERPL-MCNC: 35 UG/DL (ref 37–145)
POTASSIUM SERPL-SCNC: 4.1 MMOL/L (ref 3.4–5.3)
SODIUM SERPL-SCNC: 139 MMOL/L (ref 135–145)
TSH SERPL DL<=0.005 MIU/L-ACNC: 0.68 UIU/ML (ref 0.3–4.2)
VIT B12 SERPL-MCNC: 634 PG/ML (ref 232–1245)

## 2024-04-08 PROCEDURE — 82607 VITAMIN B-12: CPT | Mod: ZL

## 2024-04-08 PROCEDURE — 99214 OFFICE O/P EST MOD 30 MIN: CPT

## 2024-04-08 PROCEDURE — 80048 BASIC METABOLIC PNL TOTAL CA: CPT | Mod: ZL

## 2024-04-08 PROCEDURE — 36415 COLL VENOUS BLD VENIPUNCTURE: CPT | Mod: ZL

## 2024-04-08 PROCEDURE — 84443 ASSAY THYROID STIM HORMONE: CPT | Mod: ZL

## 2024-04-08 RX ORDER — SODIUM FLUORIDE 6 MG/ML
PASTE, DENTIFRICE DENTAL SEE ADMIN INSTRUCTIONS
COMMUNITY
Start: 2023-10-30

## 2024-04-08 SDOH — HEALTH STABILITY: PHYSICAL HEALTH: ON AVERAGE, HOW MANY DAYS PER WEEK DO YOU ENGAGE IN MODERATE TO STRENUOUS EXERCISE (LIKE A BRISK WALK)?: 2 DAYS

## 2024-04-08 SDOH — HEALTH STABILITY: PHYSICAL HEALTH: ON AVERAGE, HOW MANY MINUTES DO YOU ENGAGE IN EXERCISE AT THIS LEVEL?: 20 MIN

## 2024-04-08 ASSESSMENT — SOCIAL DETERMINANTS OF HEALTH (SDOH): HOW OFTEN DO YOU GET TOGETHER WITH FRIENDS OR RELATIVES?: ONCE A WEEK

## 2024-04-08 ASSESSMENT — ENCOUNTER SYMPTOMS
SLEEP DISTURBANCE: 0
UNEXPECTED WEIGHT CHANGE: 1
FATIGUE: 1
PALPITATIONS: 1
LIGHT-HEADEDNESS: 1

## 2024-04-08 ASSESSMENT — PAIN SCALES - GENERAL: PAINLEVEL: NO PAIN (0)

## 2024-04-08 NOTE — PROGRESS NOTES
"  Assessment & Plan     ICD-10-CM    1. Palpitations  R00.2 Echocardiogram Complete     Basic Metabolic Panel     Ferritin     Iron & Iron Binding Capacity     Vitamin B12     TSH Reflex GH     Basic Metabolic Panel     Vitamin B12     TSH Reflex GH      2. Iron deficiency  E61.1          Palpitations: Reviewed lab work, normal TSH, B12, electrolytes. Iron level is slightly low at 35, iron sat slightly low at 11-normal ferritin level.  Reviewed reassuring ER workup.  She currently has a Zio patch monitor on. We will proceed with an echocardiogram and she has a cardiology appointment at the end of the month. Instructed patient to increase oral intake of iron through her diet- start a multivitamin. If she has a negative cardiac workup it is possible that her symptoms are from iron deficiency anemia and/or long-haul COVID. Instructed her to follow up after her cardiology appointment and after taking vitamin supplementation- we can re-evaluate lab work in 2-3 months and discuss symptoms.                BMI  Estimated body mass index is 25.68 kg/m  as calculated from the following:    Height as of this encounter: 1.575 m (5' 2\").    Weight as of this encounter: 63.7 kg (140 lb 6.4 oz).       Counseling  Appropriate preventive services were discussed with this patient, including applicable screening as appropriate for fall prevention, nutrition, physical activity, Tobacco-use cessation, weight loss and cognition.  Checklist reviewing preventive services available has been given to the patient.  Reviewed patient's diet, addressing concerns and/or questions.   She is at risk for lack of exercise and has been provided with information to increase physical activity for the benefit of her well-being.   She is at risk for psychosocial distress and has been provided with information to reduce risk.       No follow-ups on file.      EVAN Davis [facility]    Review of Systems   Constitutional: "  Positive for fatigue (last month) and unexpected weight change (poor appetite).   Cardiovascular:  Positive for palpitations. Negative for peripheral edema.   Genitourinary:  Negative for menstrual problem.   Neurological:  Positive for light-headedness.   Psychiatric/Behavioral:  Negative for sleep disturbance.         + no new stressors               Subjective   Shey is a 38 year old, presenting for the following health issues:  RECHECK (Heart palpitations)    Patient presents to clinic for ER follow-up of palpitations.  She states that approximately 2 weeks ago she developed a lightheaded sensation while she was working on homework with her son and noticed that her heart was pounding.  This spontaneously resolved, a week later she felt the same sensation of lightheadedness with her heart pounding.  She continues to have occasional fluttering sensation in her chest/hiccup sensation unrelated to activity.  Denies any syncopal episodes.  She does note that she has had a poor appetite lately, she drinks very minimal amounts of caffeine-approximately half a cup of coffee a day.  She denies having any new life stressors or worsening anxiety.  She denies any personal cardiac history, her mother had unspecified valvular heart disease.  She does mention that she had COVID leading up to her symptoms.      History of Present Illness       Reason for visit:  Er followup  Symptom onset:  1-2 weeks ago  Symptoms include:  Rapid heartbeat lightheadedness  Symptom intensity:  Mild  Symptom progression:  Staying the same  Had these symptoms before:  NoShe exercises with enough effort to increase her heart rate 9 or less minutes per day.  She exercises with enough effort to increase her heart rate 3 or less days per week.   She is taking medications regularly.         ED/UC Followup:  Facility:  [facility]   Date of visit: 04/04/24  Reason for visit: Palpitations  Current Status: Stable            Objective    /68 (BP  "Location: Right arm, Patient Position: Sitting, Cuff Size: Adult Regular)   Pulse 107   Temp 97.1  F (36.2  C) (Temporal)   Resp 16   Ht 1.575 m (5' 2\")   Wt 63.7 kg (140 lb 6.4 oz)   SpO2 99%   BMI 25.68 kg/m    Body mass index is 25.68 kg/m .  Physical Exam  Vitals reviewed.   Constitutional:       General: She is not in acute distress.     Appearance: She is well-developed.   HENT:      Head: Normocephalic and atraumatic.      Nose: Nose normal.      Mouth/Throat:      Pharynx: No oropharyngeal exudate.   Eyes:      General: No scleral icterus.     Conjunctiva/sclera: Conjunctivae normal.      Pupils: Pupils are equal, round, and reactive to light.   Neck:      Thyroid: No thyromegaly.   Cardiovascular:      Rate and Rhythm: Normal rate and regular rhythm.      Heart sounds: No murmur heard.  Pulmonary:      Effort: Pulmonary effort is normal. No respiratory distress.      Breath sounds: Normal breath sounds. No wheezing.   Musculoskeletal:         General: No tenderness or deformity. Normal range of motion.      Cervical back: Normal range of motion and neck supple.   Skin:     General: Skin is warm and dry.      Findings: No rash.   Neurological:      Mental Status: She is alert and oriented to person, place, and time.      Cranial Nerves: No cranial nerve deficit.      Coordination: Coordination normal.   Psychiatric:         Behavior: Behavior normal.         Thought Content: Thought content normal.         Judgment: Judgment normal.        Results for orders placed or performed in visit on 04/08/24   Basic Metabolic Panel     Status: Abnormal   Result Value Ref Range    Sodium 139 135 - 145 mmol/L    Potassium 4.1 3.4 - 5.3 mmol/L    Chloride 104 98 - 107 mmol/L    Carbon Dioxide (CO2) 27 22 - 29 mmol/L    Anion Gap 8 7 - 15 mmol/L    Urea Nitrogen 9.7 6.0 - 20.0 mg/dL    Creatinine 0.70 0.51 - 0.95 mg/dL    GFR Estimate >90 >60 mL/min/1.73m2    Calcium 9.5 8.6 - 10.0 mg/dL    Glucose 106 (H) 70 - 99 " mg/dL   Vitamin B12     Status: Normal   Result Value Ref Range    Vitamin B12 634 232 - 1,245 pg/mL   TSH Reflex GH     Status: Normal   Result Value Ref Range    TSH 0.68 0.30 - 4.20 uIU/mL                   Signed Electronically by: EVAN Davis CNP

## 2024-04-08 NOTE — NURSING NOTE
"Chief Complaint   Patient presents with    RECHECK     Heart palpitations     Patient presents to the clinic today for a recheck heart palpitations  Initial There were no vitals taken for this visit. Estimated body mass index is 25.61 kg/m  as calculated from the following:    Height as of 4/4/24: 1.575 m (5' 2\").    Weight as of 4/4/24: 63.5 kg (140 lb).  Meds Reconciled: complete      Avelina Tinoco LPN,LPN on 4/8/2024 at 8:33 AM  Ext. [phone]        Avelina Tinoco LPN  "

## 2024-04-08 NOTE — NURSING NOTE
"Chief Complaint   Patient presents with    RECHECK     Heart palpitations       Initial There were no vitals taken for this visit. Estimated body mass index is 25.61 kg/m  as calculated from the following:    Height as of 4/4/24: 1.575 m (5' 2\").    Weight as of 4/4/24: 63.5 kg (140 lb).  Meds Reconciled: complete      Avelina Tinoco LPN,LEÓN on 4/8/2024 at 8:42 AM  Ext. [phone]        Avelina Tinoco LPN  "

## 2024-04-09 LAB
ATRIAL RATE - MUSE: 83 BPM
DIASTOLIC BLOOD PRESSURE - MUSE: NORMAL MMHG
INTERPRETATION ECG - MUSE: NORMAL
P AXIS - MUSE: 61 DEGREES
PR INTERVAL - MUSE: 140 MS
QRS DURATION - MUSE: 92 MS
QT - MUSE: 388 MS
QTC - MUSE: 455 MS
R AXIS - MUSE: 86 DEGREES
SYSTOLIC BLOOD PRESSURE - MUSE: NORMAL MMHG
T AXIS - MUSE: 29 DEGREES
VENTRICULAR RATE- MUSE: 83 BPM

## 2024-04-23 NOTE — PROGRESS NOTES
"Service Date: 2024      RE:  Shey Malone   MRN:  [id number]   :  1985       To Whom It May Concern:    It was a pleasure participating in the care of your patient, Shey Malone .  As you know, she is a 38 year old year old person who I met over a virtual visit today for palpitations.    Past medical history is significant for the followin.  Anxiety/panic attacks    Patient does not have a documented history of cardiac disease    Patient was seen in the emergency department at [facility] on 2024 complaining of shortness of breath and chest pain after having had a upper respiratory infection 2 weeks prior.  Workup was negative, and she was treated for anxiety with as needed hydroxyzine.    She was then seen again in the ER 2 days later on 2024 complaining of palpitations, at that time a Zio patch monitor was placed and a cardiology referral was made.  She presents today for further evaluation and treatment.    From a clinical standpoint, she reports having had COVID in early March.  Ever since then she has been experiencing the palpitations described as a thumping, or \"hiccuping\" and mild racing.  It is not accompanied by dizziness lightheadedness syncope or near syncope, but rather she notices it and worries about what it might represent.  She still has them about 1-2 times a day for very short limited periods of time.    She is able to walk for 20 minutes 3 times a week and climb the stairs up and down without limiting symptoms.  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    MEDICATIONS:    1.  Hydroxyzine 25 3 times daily as needed anxiety    PHYSICAL EXAM:    4/8/2024 blood pressure 116/68, pulse 100, weight 140 pounds  General:  Patient appears comfortable, well groomed  Psych:  Patient is alert and oriented X 3  Eyes:  No gross erythema, exudate  Respiratory:  Patient is breathing comfortably without gross cough    The remainder of the comprehensive physical exam was deferred secondary to the COVID-19 pandemic and secondary to virtual visit restrictions.    LABS:    4/8/2024: Potassium 4.1, GFR normal, TSH normal, hemoglobin normal    EKG 4/4/2024: Normal sinus rhythm at a rate of 83 bpm, nonspecific T wave changes in leads III and F    Zio patch monitor 4/19/2024 reveals 12 beat run of SVT that occurred during sleep possible atrial tachycardia, asymptomatic, patient's symptoms correlated with either normal sinus rhythm plus or minus supraventricular/ventricular ectopy that was of low burden less than 1%, no sustained malignant arrhythmias were identified    Echocardiogram 4/24/2024 reveals normal LV systolic function ejection fraction 60 to 65% without significant valvular pathology    IMPRESSION:    Shey is a 38-year-old lady without a prior documented history of cardiac disease who presents with several active issues:    1.  Palpitations    The palpitations that she experienced were captured on Zio patch monitor 4/19/2024, and represents either normal sinus rhythm or isolated supraventricular and ventricular ectopy of low burden less than 1% respectively.  She has a structurally normal heart by echo 4/24/2024 showing an ejection fraction of 60 to 65% without significant valvular pathology.    The underlying etiology for her newly sensed palpitations is not entirely clear but certainly could be part of a post-COVID type of syndrome, in which PACs and PVCs could represent some continued irritability of her  "system as her body recovers from his prior event.    No further testing or treatment would currently be indicated at the present time.    2.  Blood pressure control    Blood pressures running 116/68 continue to follow    3.  Lipids    She is due for fasting lipid profile in the future      PLAN:    1.  We had a significant discussion today regarding her test results and her clinical symptomatology, and she was reassured that her echocardiogram showed a structurally normal heart and that her Zio patch monitor did not reveal any significant sustained malignant arrhythmias, and that her symptoms will hopefully/likely resolve spontaneously in the future.    2.  Abstain from alcohol and caffeine or any other stimulants or exacerbating factors.    3.  Follow-up with primary physician, and can return to see me on an as-needed basis if symptoms should persist, worsen or if other cardiac issues should arise in the future.    Once again, it was a pleasure participating in the care of your patient, Shey Malone .  Please feel free to contact me at any time if there are any questions regarding her care in the future.      Sincerely,          Cam Plascencia M.D.  Cardiovascular Division  [facility]    The patient has been notified of following:     \"This video visit will be conducted via a call between you and your physician/provider. We have found that certain health care needs can be provided without the need for an in-person physical exam.  This service lets us provide the care you need with a video conversation.  If a prescription is necessary we can send it directly to your pharmacy.  If lab work is needed we can place an order for that and you can then stop by our lab to have the test done at a later time.    Video visits are billed at different rates depending on your insurance coverage.  Please reach out to your insurance provider with any questions.    If during the course of the call the " "physician/provider feels a video visit is not appropriate, you will not be charged for this service.\"    Patient has given verbal consent for video visit? Yes    How would you like to obtain your AVS? Mail    Video-Visit Details    Type of service:  Video Visit    Video Start Time:1030am    Video End Time:1102am    Total visit time including video visit, chart review, charting, coordination of care =62min    Originating Location (pt. Location):patient home      Distant Location (provider location):   Off site home office    Platform used for Video Visit: Ruperto REY#:[id number]           "

## 2024-04-24 ENCOUNTER — HOSPITAL ENCOUNTER (OUTPATIENT)
Dept: CARDIOLOGY | Facility: OTHER | Age: 39
Discharge: HOME | End: 2024-04-24
Payer: COMMERCIAL

## 2024-04-24 LAB — LVEF ECHO: NORMAL

## 2024-04-24 PROCEDURE — 93306 TTE W/DOPPLER COMPLETE: CPT | Mod: 26 | Performed by: INTERNAL MEDICINE

## 2024-04-24 PROCEDURE — 93306 TTE W/DOPPLER COMPLETE: CPT

## 2024-04-25 ENCOUNTER — VIRTUAL VISIT (OUTPATIENT)
Dept: CARDIOLOGY | Facility: OTHER | Age: 39
End: 2024-04-25
Attending: INTERNAL MEDICINE
Payer: COMMERCIAL

## 2024-04-25 VITALS — WEIGHT: 140 LBS | BODY MASS INDEX: 25.76 KG/M2 | HEIGHT: 62 IN

## 2024-04-25 DIAGNOSIS — R00.2 PALPITATIONS: ICD-10-CM

## 2024-04-25 PROCEDURE — 99203 OFFICE O/P NEW LOW 30 MIN: CPT | Mod: 95 | Performed by: INTERNAL MEDICINE

## 2024-04-25 ASSESSMENT — PAIN SCALES - GENERAL: PAINLEVEL: NO PAIN (0)

## 2024-04-25 NOTE — NURSING NOTE
Patient confirms medications and allergies are accurate via patients echeck in completion, and or denies any changes since last reviewed/verified.     Is the patient currently in the state of [place]? YES    Visit mode:VIDEO    If the visit is dropped, the patient can be reconnected by: VIDEO VISIT: Text to cell phone:   Telephone Information:   Mobile [phone]       Will anyone else be joining the visit? NO  (If patient encounters technical issues they should call [phone]    How would you like to obtain your AVS? MyChart    Are changes needed to the allergy or medication list? No    Are refills needed on medications prescribed by this physician? NO    Reason for visit: Consult    Lena LOUIE

## 2024-04-26 PROCEDURE — 93248 EXT ECG>7D<15D REV&INTERPJ: CPT | Performed by: INTERNAL MEDICINE

## 2024-05-04 ENCOUNTER — HEALTH MAINTENANCE LETTER (OUTPATIENT)
Age: 39
End: 2024-05-04

## 2024-11-30 ENCOUNTER — HEALTH MAINTENANCE LETTER (OUTPATIENT)
Age: 39
End: 2024-11-30

## 2025-05-17 ENCOUNTER — HEALTH MAINTENANCE LETTER (OUTPATIENT)
Age: 40
End: 2025-05-17